# Patient Record
Sex: MALE | Race: WHITE | NOT HISPANIC OR LATINO | Employment: FULL TIME | URBAN - METROPOLITAN AREA
[De-identification: names, ages, dates, MRNs, and addresses within clinical notes are randomized per-mention and may not be internally consistent; named-entity substitution may affect disease eponyms.]

---

## 2023-07-28 ENCOUNTER — TRANSFERRED RECORDS (OUTPATIENT)
Dept: HEALTH INFORMATION MANAGEMENT | Facility: CLINIC | Age: 38
End: 2023-07-28
Payer: COMMERCIAL

## 2023-08-02 ENCOUNTER — TELEPHONE (OUTPATIENT)
Dept: ORTHOPEDICS | Facility: CLINIC | Age: 38
End: 2023-08-02
Payer: COMMERCIAL

## 2023-08-02 NOTE — TELEPHONE ENCOUNTER
I spoke with the patient and I let him know that we would not be able to do surgery the following day. I did let him know that we could switch his consult visit to a virtual visit so then they would only need to travel here for surgery. He agreed to this and we changed his upcoming visit to a video visit. He asked if he should get a pre-op visit done now I recommended he wait until after Monday but he could schedule it sometime next week. He understood this and had no further questions at this time.     STANISLAV Rae

## 2023-08-02 NOTE — TELEPHONE ENCOUNTER
[facility] Call Center    Phone Message    May a detailed message be left on voicemail: yes     Reason for Call: Symptoms or Concerns     If patient has red-flag symptoms, warm transfer to triage line    Current symptom or concern: Wife is calling as pt has a complete ACL rupture. He was seen at [facility] by Patrice Mckeon, MARICHUY, IKMMIE, JOE-JEWELL. MRI was done Tues July 25 th. Wife states that Geoff was referred to Dr. Joiner. I do not see a referral or records but I asked her to have them faxed to [phone] ASAP.  I have pt scheduled for Monday Aug 7th as this was the soonest appt and okayed by Kathleen. Wife states that they live 4 hours away and wonders if surgery could be done the next day so to save them another trip.     Symptoms have been present for:  unsure but MRI was done July 25 th  day(s)    Has patient previously been seen for this? No    By Dr. Joiner    Date: scheduled for Aug. 7th     Are there any new or worsening symptoms? No    Action Taken: message sent to team     Travel Screening: n/a

## 2023-08-03 NOTE — TELEPHONE ENCOUNTER
DIAGNOSIS:  pt. # [id number]   *Records received, images pushed to PACS  Referred to Dr. Joiner for complete ACL rupture.  MRI done July 25th  Asked pt to have records faxed    APPOINTMENT DATE: 08/07/23   NOTES STATUS DETAILS   OFFICE NOTE from referring provider Care Everywhere 07/11/23- Arlen Veliz PA-C   OFFICE NOTE from other specialist Care Everywhere 07/20/23- [facility]  - Kaylene Mcclellan PA-C    07/25/23- [facility]  - Katherine Oneill, CNP    07/28/23- [place]   MEDICATION LIST Internal    MRI PACS MR L Knee - 07/25/23 - José Antoniok   XRAYS (IMAGES & REPORTS) PACS XR L Knee - 07/28/23 - Yvonne

## 2023-08-07 ENCOUNTER — VIRTUAL VISIT (OUTPATIENT)
Dept: ORTHOPEDICS | Facility: CLINIC | Age: 38
End: 2023-08-07
Payer: OTHER MISCELLANEOUS

## 2023-08-07 ENCOUNTER — TELEPHONE (OUTPATIENT)
Dept: ORTHOPEDICS | Facility: CLINIC | Age: 38
End: 2023-08-07

## 2023-08-07 ENCOUNTER — PRE VISIT (OUTPATIENT)
Dept: ORTHOPEDICS | Facility: CLINIC | Age: 38
End: 2023-08-07

## 2023-08-07 DIAGNOSIS — S83.512A RUPTURE OF ANTERIOR CRUCIATE LIGAMENT OF LEFT KNEE, INITIAL ENCOUNTER: Primary | ICD-10-CM

## 2023-08-07 PROCEDURE — 99204 OFFICE O/P NEW MOD 45 MIN: CPT | Mod: VID | Performed by: ORTHOPAEDIC SURGERY

## 2023-08-07 NOTE — PROGRESS NOTES
Geoff is a 37 year old who is being evaluated via a billable video visit.      How would you like to obtain your AVS? Mail a copy  If the video visit is dropped, the invitation should be resent by: Text to cell phone: [phone]  Will anyone else be joining your video visit? No      CHIEF CONCERN: injured left knee 7/10    HISTORY:   Work related injury while unloading off a truck a large steel culvert plate. Works as an . Immediately felt pain, now pain is 3-4 /10. Using crutches. Wearing brace on Left knee    PAST MEDICAL HISTORY: (Reviewed with the patient and in the EPIC medical record)  none    PAST SURGICAL HISTORY: (Reviewed with the patient and in the EPIC medical record)  Left elbow biceps repair, right ankle  No knee surgery    MEDICATIONS: (Reviewed with the patient and in the EPIC medical record)    Notable medications include: none    ALLERGIES: (Reviewed with the patient and in the EPIC medical record)  none      SOCIAL HISTORY: (Reviewed with the patient and in the medical record)  --Tobacco: none  --Occupation:   --Avocation/Sport: works on car, outdoor activity    FAMILY HISTORY: (Reviewed with the patient and in the medical record)  -- No family history of bleeding, clotting, or difficulty with anesthesia    REVIEW OF SYSTEMS: (Reviewed with the patient and on the health intake form)  -- A comprehensive 10 point review of systems was conducted and is negative except as noted in the HPI    EXAM:     General: Awake, Alert and Oriented, No acute Distress. Articulate and Interactive    There is no height or weight on file to calculate BMI.    left Lower extremity :  Skin is Warm and Well perfused, no suggestion of infection  ROM 10-90 degrees by report and approximated by video   No exam as this was a video visit    IMAGING:    Radiographs of the left knee from 7/28/23 were independently reviewed by me and findings were discussed with the patient today. The  imaging demonstrates small fx of posterior tibia.    MRI of the left knee from 7/25/23 were independently reviewed by me and findings were discussed with the patient today. The imaging demonstrates high-grade injury to the anterior cruciate ligament which appears to be a complete tear.  There is some edema within the patellar tendon as well though I do not think it represents a full tear.  No definite meniscus pathology.  Impaction fracture of the posterior medial posterior lateral tibial plateau were noted.    ASSESSMENT:  Grade 3 rupture of the anterior cruciate ligament  Impaction fracture of the posterior medial posterior lateral tibial plateau though they are minimally displaced and do not require surgical intervention  Tendinosis of the patellar tendon    PLAN:  Long discussion with the patient.  Reviewed the diagnosis potential treatment options.  I offered him quadriceps tendon autograft ACL reconstruction.  I discussed with him the pros cons risk and benefits of surgery.  I have leaned away for a BTB graft in his case because of the patellar tendinosis that I see on the MRI  We discussed the pros cons risk and benefits.  We discussed the expected course of recovery alternative treatment options.  Will look for time to schedule this can be completed.  Preoperative teaching will be completed today    Video-Visit Details    Type of service:  Video Visit     Originating Location (pt. Location): Home    Distant Location (provider location):  On-site  Platform used for Video Visit: SIVI

## 2023-08-07 NOTE — TELEPHONE ENCOUNTER
Patient is scheduled for surgery with Dr. Joiner    Spoke with: Patient    Date of Surgery: 8/25/23    Location: [facility]    Post op: would like 1 week locally, 6 week in person    Pre op with Provider: Complete    H&P: Scheduled with PCP 8/8/23, Patrice Cardoso    Additional imaging/appointments: N/A    Surgery packet: Mailed to patient today     Additional comments: N/A        Leighann Smith MA on 8/7/2023 at 11:22 AM

## 2023-08-07 NOTE — LETTER
8/7/2023         RE: Geoff Chase  [address] [place] [address]      Dear Colleague,    Thank you for referring your patient, Geoff Chase, to the [facility] [place]. Please see a copy of my visit note below.      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 Works as an . Immediately felt pain, now pain is 3-4 /10. Using crutches. Wearing brace on Left knee    PAST MEDICAL HISTORY: (Reviewed with the patient and in the [facility] medical record)  none    PAST SURGICAL HISTORY: (Reviewed with the patient and in the [facility] medical record)  Left elbow biceps repair, right ankle  No knee surgery    MEDICATIONS: (Reviewed with the patient and in the [facility] medical record)    Notable medications include: none    ALLERGIES: (Reviewed with the patient and in the [facility] medical record)  none      SOCIAL HISTORY: (Reviewed with the patient and in the medical record)  --Tobacco: none  --Occupation:   --Avocation/Sport: works on car, outdoor activity    FAMILY HISTORY: (Reviewed with the patient and in the medical record)  -- No family history of bleeding, clotting, or difficulty with anesthesia    REVIEW OF SYSTEMS: (Reviewed with the patient and on the health intake form)  -- A comprehensive 10 point review of systems was conducted and is negative except as noted in the HPI    EXAM:     General: Awake, Alert and Oriented, No acute Distress. Articulate and Interactive    There is no height or weight on file to calculate BMI.    left Lower extremity :  Skin is Warm and Well perfused, no suggestion of infection  ROM 10-90 degrees by report and approximated by video   No exam as this was a video visit    IMAGING:    Radiographs of the left knee from 7/28/23 were independently reviewed by me and findings were discussed with the patient today. The imaging  demonstrates small fx of posterior tibia.    MRI of the left knee from 7/25/23 were independently reviewed by me and findings were discussed with the patient today. The imaging demonstrates high-grade injury to the anterior cruciate ligament which appears to be a complete tear.  There is some edema within the patellar tendon as well though I do not think it represents a full tear.  No definite meniscus pathology.  Impaction fracture of the posterior medial posterior lateral tibial plateau were noted.    ASSESSMENT:  Grade 3 rupture of the anterior cruciate ligament  Impaction fracture of the posterior medial posterior lateral tibial plateau though they are minimally displaced and do not require surgical intervention  Tendinosis of the patellar tendon    PLAN:  Long discussion with the patient.  Reviewed the diagnosis potential treatment options.  I offered him quadriceps tendon autograft ACL reconstruction.  I discussed with him the pros cons risk and benefits of surgery.  I have leaned away for a BTB graft in his case because of the patellar tendinosis that I see on the MRI  We discussed the pros cons risk and benefits.  We discussed the expected course of recovery alternative treatment options.  Will look for time to schedule this can be completed.  Preoperative teaching will be completed today    Again, thank you for allowing me to participate in the care of your patient.    Sincerely,      Davey Joiner MD

## 2023-08-11 NOTE — TELEPHONE ENCOUNTER
Surgery packet received and he has reviewed.  Pre-Operative Teaching Flowsheet     Person(s) involved in teaching: Patient     Motivation Level:  Receptive (willing/able to accept information) and asks appropriate questions where applicable: Yes  Any cultural factors/Jew beliefs that may influence understanding or compliance? No     Patient demonstrates understanding of the following:  Pre-operative planning, including the necessary appointments and preparation needed prior to surgery: Yes  Which situations necessitate calling provider and whom to contact: Yes  Pain management techniques pre and post op: Yes  How, and when, to access community resources: Yes    Who will drive and stay/ with patient after surgery: Nubia Luna  Pre-op exam - Completed on 8/8/23  PT to be completed at [facility]  1 week post op locally with Patrice Mckeon.  Insurance via work comp.          Additional Teaching Concerns Addressed:   Post-operative living arrangements and necessary adaptations to living environment.     Instructional Materials Used/Given: Yes, pre-op packet given including forms for Your surgery day, preparing for surgery, showering before surgery, Stop light tool introduced, Opioid pain medication guideline, pre-op physical form, and map  Patient expressed understanding of all forms given, questions were answered and will review in more detail at home.     Time spent with patient: 20 minutes.      Work comp [phone]

## 2023-08-23 ENCOUNTER — TELEPHONE (OUTPATIENT)
Dept: ORTHOPEDICS | Facility: CLINIC | Age: 38
End: 2023-08-23

## 2023-08-23 NOTE — TELEPHONE ENCOUNTER
Phoned patient to reschedule his surgery with Dr Joiner. Patient rescheduled from 8/25/23 to 8/31/23. Patient confirmed he will hold taking his weight loss medication. H&P completed in August, will not need to repeat. Patient will wait for call from surgery center next week to confirm his arrival time.

## 2023-08-30 ENCOUNTER — ANESTHESIA EVENT (OUTPATIENT)
Dept: SURGERY | Facility: AMBULATORY SURGERY CENTER | Age: 38
End: 2023-08-30

## 2023-08-30 NOTE — ANESTHESIA PREPROCEDURE EVALUATION
Anesthesia Pre-Procedure Evaluation    Patient: Geoff Chase   MRN: [id number] : 1985        Procedure : Procedure(s):  left knee examination under anesthesia, knee arthroscopy, quadriceps tendon autograft Anterior Cruciate Ligament reconstruction,  meniscus surgery.          Past Medical History:   Diagnosis Date    NO ACTIVE PROBLEMS       Past Surgical History:   Procedure Laterality Date    PE TUBES        No Known Allergies   Social History     Tobacco Use    Smoking status: Never    Smokeless tobacco: Not on file    Tobacco comments:     Nonsmoking household   Substance Use Topics    Alcohol use: Yes      Wt Readings from Last 1 Encounters:   11 83.5 kg (184 lb)        Anesthesia Evaluation            ROS/MED HX  ENT/Pulmonary:  - neg pulmonary ROS     Neurologic:  - neg neurologic ROS     Cardiovascular:  - neg cardiovascular ROS     METS/Exercise Tolerance:     Hematologic:  - neg hematologic  ROS     Musculoskeletal: Comment: L knee weakness      GI/Hepatic:  - neg GI/hepatic ROS     Renal/Genitourinary:  - neg Renal ROS     Endo:  - neg endo ROS     Psychiatric/Substance Use:  - neg psychiatric ROS     Infectious Disease:  - neg infectious disease ROS     Malignancy:  - neg malignancy ROS     Other:  - neg other ROS          Physical Exam    Airway  airway exam normal           Respiratory Devices and Support         Dental       (+) Minor Abnormalities - some fillings, tiny chips      Cardiovascular   cardiovascular exam normal          Pulmonary   pulmonary exam normal                OUTSIDE LABS:  CBC: No results found for: WBC, HGB, HCT, PLT  BMP:   Lab Results   Component Value Date     (H) 2010     COAGS: No results found for: PTT, INR, FIBR  POC: No results found for: BGM, HCG, HCGS  HEPATIC: No results found for: ALBUMIN, PROTTOTAL, ALT, AST, GGT, ALKPHOS, BILITOTAL, BILIDIRECT, AMELIA  OTHER: No results found for: PH, LACT, A1C, SARIKA, PHOS, MAG, LIPASE, AMYLASE,  TSH, T4, T3, CRP, SED    Anesthesia Plan    ASA Status:  2    NPO Status:  NPO Appropriate    Anesthesia Type: General.     - Airway: LMA   Induction: Intravenous, Propofol.   Maintenance: Balanced.        Consents    Anesthesia Plan(s) and associated risks, benefits, and realistic alternatives discussed. Questions answered and patient/representative(s) expressed understanding.     - Discussed:     - Discussed with:  Patient      - Extended Intubation/Ventilatory Support Discussed: No.      - Patient is DNR/DNI Status: No     Use of blood products discussed: No .     Postoperative Care    Pain management: IV analgesics, Oral pain medications, Multi-modal analgesia, Peripheral nerve block (Single Shot).   PONV prophylaxis: Dexamethasone or Solumedrol, Ondansetron (or other 5HT-3), Background Propofol Infusion     Comments:                Garth Yadav MD

## 2023-08-31 ENCOUNTER — ANESTHESIA (OUTPATIENT)
Dept: SURGERY | Facility: AMBULATORY SURGERY CENTER | Age: 38
End: 2023-08-31

## 2023-08-31 ENCOUNTER — HOSPITAL ENCOUNTER (OUTPATIENT)
Facility: AMBULATORY SURGERY CENTER | Age: 38
Discharge: HOME | End: 2023-08-31
Attending: ORTHOPAEDIC SURGERY | Admitting: ORTHOPAEDIC SURGERY
Payer: OTHER MISCELLANEOUS

## 2023-08-31 VITALS
RESPIRATION RATE: 12 BRPM | HEIGHT: 69 IN | WEIGHT: 235 LBS | SYSTOLIC BLOOD PRESSURE: 121 MMHG | HEART RATE: 81 BPM | BODY MASS INDEX: 34.8 KG/M2 | TEMPERATURE: 97.1 F | DIASTOLIC BLOOD PRESSURE: 79 MMHG | OXYGEN SATURATION: 97 %

## 2023-08-31 DIAGNOSIS — S83.512A RUPTURE OF ANTERIOR CRUCIATE LIGAMENT OF LEFT KNEE, INITIAL ENCOUNTER: Primary | ICD-10-CM

## 2023-08-31 PROCEDURE — 29888 ARTHRS AID ACL RPR/AGMNTJ: CPT | Mod: LT

## 2023-08-31 PROCEDURE — C9290 INJ, BUPIVACAINE LIPOSOME: HCPCS

## 2023-08-31 PROCEDURE — C1713 ANCHOR/SCREW BN/BN,TIS/BN: HCPCS

## 2023-08-31 PROCEDURE — 29882 ARTHRS KNE SRG MNISC RPR M/L: CPT | Mod: LT

## 2023-08-31 PROCEDURE — C1762 CONN TISS, HUMAN(INC FASCIA): HCPCS

## 2023-08-31 DEVICE — IMP FIBERSTITCH CVD W//TWO POLY & 2-0 FIBERWIRE AR-4570: Type: IMPLANTABLE DEVICE | Site: KNEE | Status: FUNCTIONAL

## 2023-08-31 DEVICE — IMPLANTABLE DEVICE: Type: IMPLANTABLE DEVICE | Site: KNEE | Status: FUNCTIONAL

## 2023-08-31 RX ORDER — OXYCODONE HYDROCHLORIDE 5 MG/1
10 TABLET ORAL
Status: DISCONTINUED | OUTPATIENT
Start: 2023-08-31 | End: 2023-09-01 | Stop reason: HOSPADM

## 2023-08-31 RX ORDER — ONDANSETRON 4 MG/1
4 TABLET, ORALLY DISINTEGRATING ORAL EVERY 30 MIN PRN
Status: DISCONTINUED | OUTPATIENT
Start: 2023-08-31 | End: 2023-09-01 | Stop reason: HOSPADM

## 2023-08-31 RX ORDER — NALOXONE HYDROCHLORIDE 0.4 MG/ML
0.2 INJECTION, SOLUTION INTRAMUSCULAR; INTRAVENOUS; SUBCUTANEOUS
Status: DISCONTINUED | OUTPATIENT
Start: 2023-08-31 | End: 2023-09-01 | Stop reason: HOSPADM

## 2023-08-31 RX ORDER — ONDANSETRON 4 MG/1
4 TABLET, ORALLY DISINTEGRATING ORAL
Status: CANCELLED | OUTPATIENT
Start: 2023-08-31

## 2023-08-31 RX ORDER — HYDROMORPHONE HYDROCHLORIDE 1 MG/ML
0.4 INJECTION, SOLUTION INTRAMUSCULAR; INTRAVENOUS; SUBCUTANEOUS EVERY 5 MIN PRN
Status: DISCONTINUED | OUTPATIENT
Start: 2023-08-31 | End: 2023-09-01 | Stop reason: HOSPADM

## 2023-08-31 RX ORDER — ACETAMINOPHEN 325 MG/1
650 TABLET ORAL EVERY 4 HOURS PRN
Qty: 50 TABLET | Refills: 0 | Status: SHIPPED | OUTPATIENT
Start: 2023-08-31

## 2023-08-31 RX ORDER — AMOXICILLIN 250 MG
1-2 CAPSULE ORAL 2 TIMES DAILY
Qty: 30 TABLET | Refills: 0 | Status: SHIPPED | OUTPATIENT
Start: 2023-08-31 | End: 2023-10-16

## 2023-08-31 RX ORDER — ACETAMINOPHEN 325 MG/1
975 TABLET ORAL ONCE
Status: DISCONTINUED | OUTPATIENT
Start: 2023-08-31 | End: 2023-09-01 | Stop reason: HOSPADM

## 2023-08-31 RX ORDER — DIPHENHYDRAMINE HYDROCHLORIDE 50 MG/ML
12.5 INJECTION INTRAMUSCULAR; INTRAVENOUS ONCE
Status: COMPLETED | OUTPATIENT
Start: 2023-08-31 | End: 2023-08-31

## 2023-08-31 RX ORDER — NALOXONE HYDROCHLORIDE 0.4 MG/ML
0.4 INJECTION, SOLUTION INTRAMUSCULAR; INTRAVENOUS; SUBCUTANEOUS
Status: DISCONTINUED | OUTPATIENT
Start: 2023-08-31 | End: 2023-09-01 | Stop reason: HOSPADM

## 2023-08-31 RX ORDER — SODIUM CHLORIDE, SODIUM LACTATE, POTASSIUM CHLORIDE, CALCIUM CHLORIDE 600; 310; 30; 20 MG/100ML; MG/100ML; MG/100ML; MG/100ML
INJECTION, SOLUTION INTRAVENOUS CONTINUOUS
Status: DISCONTINUED | OUTPATIENT
Start: 2023-08-31 | End: 2023-09-01 | Stop reason: HOSPADM

## 2023-08-31 RX ORDER — OXYCODONE HYDROCHLORIDE 5 MG/1
5-10 TABLET ORAL EVERY 4 HOURS PRN
Qty: 20 TABLET | Refills: 0 | Status: SHIPPED | OUTPATIENT
Start: 2023-08-31 | End: 2023-10-16

## 2023-08-31 RX ORDER — ONDANSETRON 2 MG/ML
INJECTION INTRAMUSCULAR; INTRAVENOUS PRN
Status: DISCONTINUED | OUTPATIENT
Start: 2023-08-31 | End: 2023-08-31

## 2023-08-31 RX ORDER — OXYCODONE HYDROCHLORIDE 5 MG/1
5 TABLET ORAL
Status: CANCELLED | OUTPATIENT
Start: 2023-08-31

## 2023-08-31 RX ORDER — HYDROXYZINE HYDROCHLORIDE 25 MG/1
25 TABLET, FILM COATED ORAL
Status: CANCELLED | OUTPATIENT
Start: 2023-08-31

## 2023-08-31 RX ORDER — PROPOFOL 10 MG/ML
INJECTION, EMULSION INTRAVENOUS PRN
Status: DISCONTINUED | OUTPATIENT
Start: 2023-08-31 | End: 2023-08-31

## 2023-08-31 RX ORDER — ONDANSETRON 4 MG/1
4 TABLET, ORALLY DISINTEGRATING ORAL EVERY 8 HOURS PRN
Qty: 4 TABLET | Refills: 0 | Status: SHIPPED | OUTPATIENT
Start: 2023-08-31 | End: 2023-10-16

## 2023-08-31 RX ORDER — PROPOFOL 10 MG/ML
INJECTION, EMULSION INTRAVENOUS CONTINUOUS PRN
Status: DISCONTINUED | OUTPATIENT
Start: 2023-08-31 | End: 2023-08-31

## 2023-08-31 RX ORDER — BUPIVACAINE HYDROCHLORIDE 2.5 MG/ML
INJECTION, SOLUTION EPIDURAL; INFILTRATION; INTRACAUDAL
Status: COMPLETED | OUTPATIENT
Start: 2023-08-31 | End: 2023-08-31

## 2023-08-31 RX ORDER — FENTANYL CITRATE 50 UG/ML
25-50 INJECTION, SOLUTION INTRAMUSCULAR; INTRAVENOUS
Status: DISCONTINUED | OUTPATIENT
Start: 2023-08-31 | End: 2023-09-01 | Stop reason: HOSPADM

## 2023-08-31 RX ORDER — ASPIRIN 81 MG/1
162 TABLET ORAL DAILY
Qty: 60 TABLET | Refills: 0 | Status: SHIPPED | OUTPATIENT
Start: 2023-08-31 | End: 2024-03-04

## 2023-08-31 RX ORDER — KETOROLAC TROMETHAMINE 30 MG/ML
INJECTION, SOLUTION INTRAMUSCULAR; INTRAVENOUS PRN
Status: DISCONTINUED | OUTPATIENT
Start: 2023-08-31 | End: 2023-08-31

## 2023-08-31 RX ORDER — KETAMINE HYDROCHLORIDE 10 MG/ML
INJECTION INTRAMUSCULAR; INTRAVENOUS PRN
Status: DISCONTINUED | OUTPATIENT
Start: 2023-08-31 | End: 2023-08-31

## 2023-08-31 RX ORDER — FENTANYL CITRATE 50 UG/ML
50 INJECTION, SOLUTION INTRAMUSCULAR; INTRAVENOUS EVERY 5 MIN PRN
Status: DISCONTINUED | OUTPATIENT
Start: 2023-08-31 | End: 2023-09-01 | Stop reason: HOSPADM

## 2023-08-31 RX ORDER — HYDROXYZINE PAMOATE 25 MG/1
25-50 CAPSULE ORAL 3 TIMES DAILY PRN
Qty: 30 CAPSULE | Refills: 0 | Status: SHIPPED | OUTPATIENT
Start: 2023-08-31 | End: 2023-10-16

## 2023-08-31 RX ORDER — ONDANSETRON 2 MG/ML
4 INJECTION INTRAMUSCULAR; INTRAVENOUS EVERY 30 MIN PRN
Status: DISCONTINUED | OUTPATIENT
Start: 2023-08-31 | End: 2023-09-01 | Stop reason: HOSPADM

## 2023-08-31 RX ORDER — FLUMAZENIL 0.1 MG/ML
0.2 INJECTION, SOLUTION INTRAVENOUS
Status: DISCONTINUED | OUTPATIENT
Start: 2023-08-31 | End: 2023-09-01 | Stop reason: HOSPADM

## 2023-08-31 RX ORDER — ACETAMINOPHEN 325 MG/1
975 TABLET ORAL ONCE
Status: COMPLETED | OUTPATIENT
Start: 2023-08-31 | End: 2023-08-31

## 2023-08-31 RX ORDER — CEFAZOLIN SODIUM 2 G/50ML
2 SOLUTION INTRAVENOUS SEE ADMIN INSTRUCTIONS
Status: DISCONTINUED | OUTPATIENT
Start: 2023-08-31 | End: 2023-09-01 | Stop reason: HOSPADM

## 2023-08-31 RX ORDER — LIDOCAINE 40 MG/G
CREAM TOPICAL
Status: DISCONTINUED | OUTPATIENT
Start: 2023-08-31 | End: 2023-09-01 | Stop reason: HOSPADM

## 2023-08-31 RX ORDER — BUPIVACAINE HYDROCHLORIDE AND EPINEPHRINE 2.5; 5 MG/ML; UG/ML
INJECTION, SOLUTION INFILTRATION; PERINEURAL PRN
Status: DISCONTINUED | OUTPATIENT
Start: 2023-08-31 | End: 2023-08-31 | Stop reason: HOSPADM

## 2023-08-31 RX ORDER — OXYCODONE HYDROCHLORIDE 5 MG/1
5 TABLET ORAL
Status: COMPLETED | OUTPATIENT
Start: 2023-08-31 | End: 2023-08-31

## 2023-08-31 RX ORDER — FENTANYL CITRATE 50 UG/ML
25 INJECTION, SOLUTION INTRAMUSCULAR; INTRAVENOUS EVERY 5 MIN PRN
Status: DISCONTINUED | OUTPATIENT
Start: 2023-08-31 | End: 2023-09-01 | Stop reason: HOSPADM

## 2023-08-31 RX ORDER — ACETAMINOPHEN 325 MG/1
650 TABLET ORAL
Status: CANCELLED | OUTPATIENT
Start: 2023-08-31

## 2023-08-31 RX ORDER — DEXAMETHASONE SODIUM PHOSPHATE 4 MG/ML
INJECTION, SOLUTION INTRA-ARTICULAR; INTRALESIONAL; INTRAMUSCULAR; INTRAVENOUS; SOFT TISSUE PRN
Status: DISCONTINUED | OUTPATIENT
Start: 2023-08-31 | End: 2023-08-31

## 2023-08-31 RX ORDER — CEFAZOLIN SODIUM 2 G/50ML
2 SOLUTION INTRAVENOUS
Status: COMPLETED | OUTPATIENT
Start: 2023-08-31 | End: 2023-08-31

## 2023-08-31 RX ORDER — HYDROMORPHONE HYDROCHLORIDE 1 MG/ML
0.2 INJECTION, SOLUTION INTRAMUSCULAR; INTRAVENOUS; SUBCUTANEOUS EVERY 5 MIN PRN
Status: DISCONTINUED | OUTPATIENT
Start: 2023-08-31 | End: 2023-09-01 | Stop reason: HOSPADM

## 2023-08-31 RX ORDER — LIDOCAINE HYDROCHLORIDE 20 MG/ML
INJECTION, SOLUTION INFILTRATION; PERINEURAL PRN
Status: DISCONTINUED | OUTPATIENT
Start: 2023-08-31 | End: 2023-08-31

## 2023-08-31 RX ADMIN — LIDOCAINE HYDROCHLORIDE 100 MG: 20 INJECTION, SOLUTION INFILTRATION; PERINEURAL at 08:48

## 2023-08-31 RX ADMIN — FENTANYL CITRATE 25 MCG: 50 INJECTION, SOLUTION INTRAMUSCULAR; INTRAVENOUS at 11:08

## 2023-08-31 RX ADMIN — DEXAMETHASONE SODIUM PHOSPHATE 4 MG: 4 INJECTION, SOLUTION INTRA-ARTICULAR; INTRALESIONAL; INTRAMUSCULAR; INTRAVENOUS; SOFT TISSUE at 08:48

## 2023-08-31 RX ADMIN — FENTANYL CITRATE 25 MCG: 50 INJECTION, SOLUTION INTRAMUSCULAR; INTRAVENOUS at 11:03

## 2023-08-31 RX ADMIN — HYDROMORPHONE HYDROCHLORIDE 0.2 MG: 1 INJECTION, SOLUTION INTRAMUSCULAR; INTRAVENOUS; SUBCUTANEOUS at 11:26

## 2023-08-31 RX ADMIN — OXYCODONE HYDROCHLORIDE 5 MG: 5 TABLET ORAL at 11:01

## 2023-08-31 RX ADMIN — Medication 0.5 MG: at 09:44

## 2023-08-31 RX ADMIN — PROPOFOL 350 MG: 10 INJECTION, EMULSION INTRAVENOUS at 08:48

## 2023-08-31 RX ADMIN — KETOROLAC TROMETHAMINE 30 MG: 30 INJECTION, SOLUTION INTRAMUSCULAR; INTRAVENOUS at 10:34

## 2023-08-31 RX ADMIN — PROPOFOL 150 MCG/KG/MIN: 10 INJECTION, EMULSION INTRAVENOUS at 08:48

## 2023-08-31 RX ADMIN — HYDROMORPHONE HYDROCHLORIDE 0.3 MG: 1 INJECTION, SOLUTION INTRAMUSCULAR; INTRAVENOUS; SUBCUTANEOUS at 11:34

## 2023-08-31 RX ADMIN — SODIUM CHLORIDE, SODIUM LACTATE, POTASSIUM CHLORIDE, CALCIUM CHLORIDE: 600; 310; 30; 20 INJECTION, SOLUTION INTRAVENOUS at 07:57

## 2023-08-31 RX ADMIN — Medication 0.5 MG: at 08:53

## 2023-08-31 RX ADMIN — DIPHENHYDRAMINE HYDROCHLORIDE 12.5 MG: 50 INJECTION INTRAMUSCULAR; INTRAVENOUS at 11:30

## 2023-08-31 RX ADMIN — CEFAZOLIN SODIUM 2 G: 2 SOLUTION INTRAVENOUS at 08:40

## 2023-08-31 RX ADMIN — Medication 0.5 MG: at 10:45

## 2023-08-31 RX ADMIN — FENTANYL CITRATE 50 MCG: 50 INJECTION, SOLUTION INTRAMUSCULAR; INTRAVENOUS at 08:17

## 2023-08-31 RX ADMIN — BUPIVACAINE HYDROCHLORIDE 10 ML: 2.5 INJECTION, SOLUTION EPIDURAL; INFILTRATION; INTRACAUDAL at 08:30

## 2023-08-31 RX ADMIN — ONDANSETRON 4 MG: 2 INJECTION INTRAMUSCULAR; INTRAVENOUS at 08:52

## 2023-08-31 RX ADMIN — KETAMINE HYDROCHLORIDE 50 MG: 10 INJECTION INTRAMUSCULAR; INTRAVENOUS at 08:53

## 2023-08-31 RX ADMIN — FENTANYL CITRATE 50 MCG: 50 INJECTION, SOLUTION INTRAMUSCULAR; INTRAVENOUS at 11:12

## 2023-08-31 RX ADMIN — ACETAMINOPHEN 975 MG: 325 TABLET ORAL at 07:50

## 2023-08-31 RX ADMIN — Medication 0.5 MG: at 09:52

## 2023-08-31 NOTE — ANESTHESIA POSTPROCEDURE EVALUATION
Patient: Geoff Chase    Procedure: Procedure(s):  left knee examination under anesthesia, knee arthroscopy, quadriceps tendon autograft Anterior Cruciate Ligament reconstruction,  meniscus repair.       Anesthesia Type:  General    Note:  Disposition: Outpatient   Postop Pain Control: Uneventful            Sign Out: Well controlled pain   PONV: No   Neuro/Psych: Uneventful            Sign Out: Acceptable/Baseline neuro status   Airway/Respiratory: Uneventful            Sign Out: Acceptable/Baseline resp. status   CV/Hemodynamics: Uneventful            Sign Out: Acceptable CV status; No obvious hypovolemia; No obvious fluid overload   Other NRE:    DID A NON-ROUTINE EVENT OCCUR?     Event details/Postop Comments:  Itchy and blurry vision slowly improving           Last vitals:  Vitals Value Taken Time   /79 08/31/23 1130   Temp 36.2  C (97.1  F) 08/31/23 1100   Pulse 75 08/31/23 1135   Resp 9 08/31/23 1135   SpO2 97 % 08/31/23 1104   Vitals shown include unvalidated device data.    Electronically Signed By: Garth Yadav MD  August 31, 2023  12:08 PM

## 2023-08-31 NOTE — OP NOTE
PREOPERATIVE DIAGNOSIS:   Grade 3 rupture of the left anterior cruciate ligament  No definite meniscus tears    POSTOPERATIVE DIAGNOSIS:  Grade 3 rupture the left anterior cruciate ligament  Vertical tear of the posterior horn of the medial meniscus  Vertical undersurface tear of the posterior horn of the lateral meniscus    PROCEDURE:  Examination under anesthesia left knee  Left knee arthroscopy  Quadricep tendon autograft ACL reconstruction  All inside medial meniscus repair  All inside lateral meniscus repair    DATE OF SURGERY: 8/31/2023    SURGEON: Davey Joiner MD    ASSISTANT: None.     RESIDENT OR FELLOW: Michael Ledesma MD    OPERATIVE INDICATIONS: Geoff Chase is a pleasant 37 year old who I saw through my orthopedic clinic with a history, physical, imaging consistent with left ACL rupture.  I reviewed with the patient the risks, benefits, complications, techniques and alternatives to surgery.  Together through combined decision making approach elected proceed with ACL reconstruction quadricep tendon autograft evaluation of his meniscus.  He desired to proceed.  Left we reviewed the expected course of recovery and the potential expected outcomes.  The patient understood both the risks and benefits and desired to proceed despite the risks.    OPERATIVE DETAILS: In the preoperative area the patient's informed consent was reviewed and they desired to proceed.  The left leg was marked and the patient was in agreement.  The patient was taken to the operating room where a timeout was performed and all parties were in agreement.  Preoperative antibiotics were given within 1 hour of the time of incision.  The patient was placed in the supine position and surrendered to LMA anesthesia.  No tourniquet was applied.  Egg crate was placed beneath the well leg and a side post was utilized.  The operative leg was prepped and draped in the usual sterile fashion.     Examination Under Anesthesia:    Range of  motion was 3 degrees of hyperextension to 135 degrees of flexion.  Stable to varus valgus stress testing.  Stable posterior drawer testing.  1 quadrant lateral traction patella, 1 quadrant medial.  Tilt to neutral.  2B Lachman, 1+ pivot shift.    At this time a 3 cm incision was made directly over the quadricep tendon was carried down through the skin and subcutaneous tissues and meticulous hemostasis was insured.  The quadricep tendon was identified.  And subcutaneous flaps were elevated.  We selected a section of 10 and 10 mm in width.  It began to peel it off the superior pole the patella.  We then proceeded proximally for at least 7 cm.  Care was taken to maintain a parallel cut.  After we obtain at 7 cm proximal.  It was harvested free.  It was taken to the back table where our fiber tag was attached to both ends 1 with a cortical button 1 without for later button allocation.  The final graft dimensions measured 72 mm x 10.5 on the femoral side by 10 on the tibial side.  It was tensioned at 20 pounds for 20 minutes under antibiotic saline to remove any creep.  At this time a layered closure was initiated over harvest site with 1 Vicryl suture in a running fashion and then the subcutaneous tissue was closed with 2-0 Vicryl suture and the skin was closed with Monocryl.    Anterior medial anterolateral arthroscopic portals were created and a diagnostic arthroscopy was performed with the following findings: Medial femoral condyle medial tibial plateau normal.  No femoral condyle, lateral tibial plateau normal.  Medial patella facet central ridge of the patella facet central trochlea normal.  Vertical tear of the posterior horn mid body of meniscus.  No tears when viewed through the Gillquist portal.  It was only visible on the inferior leaflet some instability to probing.  Grade 3 rupture the anterior cruciate ligament.  Vertical tear of the posterior horn of the lateral meniscus present in the undersurface only  and I did not extend to the superior surface.    At this time a shaver and a biter were introduced and a debridement of the intercondylar notch was performed we could visualize the anatomic insertion of the femoral origin we then created a nest on the tibial side with a thermal device.  With the arthroscope in the medial portal the 6-9 guide was introduced.  Osseous length measured 38 mm.  We reamed a 10.5 x 22 mm socket.  Passing suture was placed to his right medial portal.  The arthroscope was returned to the lateral portal where tip tip guide was introduced.  Osseous length measured 40 mm.  We reamed a 30 mm socket by 10 mm in diameter.  Passing suture was placed we can under no soft tissue bridges but at this time rasping was performed along the meniscosynovial junction on the medial and the lateral side.  Our skin was introduced and 1 all inside device were placed in the medial side then 1 all inside device was placed in the lateral side.  There was no further instability to probing and excellent compression was noted across the tear sites.  At this time the graft was brought up and the cortical button was deployed over the lateral cortex.  The graft was then reduced into the femoral tunnel.  The graft was well balanced within the knee.  There was at least 20 mm of graft both proximally in the femoral tunnel and distally in the tibial tunnel.  The knee was brought to full extension and the ABS button was assembled and tensioning and retention was then performed.  Then with the knee still in full extension we retention the femoral side.    Copious irrigation was performed an a layered closure was initiated, sterile dressings were applied and the patient was transferred to the recovery room in stable condition with stable vital signs.    ESTIMATED BLOOD LOSS: 25 mL.    TOURNIQUET TIME: No tourniquet was placed.    COMPLICATIONS: None apparent.    DRAINS: None.    SPECIMENS: None.     POSTOPERATIVE  PLAN:  Weightbearing as tolerated, wean from crutches when able  Knee immobilizer times 1 week then wean when able  Average time to wean from crutches and brace is 2-4 weeks  Given that overall his meniscus tears are small and really quite stable I think we can treat this as a standard ACL reconstruction without specific deference to meniscus repair surgery  The goal is to walk into my clinic at 6 weeks with no brace and no crutches  No running until 3 months  No sports until 6 months, return to game competition at 7-10 months  Shower on day 3  Start physical therapy day 3-5   x 4 weeks

## 2023-08-31 NOTE — ANESTHESIA CARE TRANSFER NOTE
"  Patient: Geoff Chase    Procedure: Procedure(s):  left knee examination under anesthesia, knee arthroscopy, quadriceps tendon autograft Anterior Cruciate Ligament reconstruction,  meniscus repair.       Diagnosis: Rupture of anterior cruciate ligament of left knee, initial encounter [S83.512A]  Diagnosis Additional Information: No value filed.    Anesthesia Type:   General     Note:    Oropharynx: oropharynx clear of all foreign objects and spontaneously breathing  Level of Consciousness: awake  Oxygen Supplementation: face mask  Level of Supplemental Oxygen (L/min / FiO2): 4  Independent Airway: airway patency satisfactory and stable  Dentition: dentition unchanged  Vital Signs Stable: post-procedure vital signs reviewed and stable  Report to RN Given: handoff report given  Patient transferred to: PACU  Comments: Uneventful transport - O2 mask off after baseline VS obtained in Phase 1  Report to RN - Marina  Exchanging well; color natl  Pt states he has an \"itchy face\"; no visible rash, blotches, or hives  Pt responds appropriately to command  IV patent  Lips/teeth/dentition as preop status  Questions answered      Handoff Report: Identifed the Patient, Identified the Reponsible Provider, Reviewed the pertinent medical history, Discussed the surgical course, Reviewed Intra-OP anesthesia mangement and issues during anesthesia, Set expectations for post-procedure period and Allowed opportunity for questions and acknowledgement of understanding      Vitals:  Vitals Value Taken Time   /67 08/31/23 1052   Temp 97.1    Pulse 84 08/31/23 1055   Resp 8 08/31/23 1055   SpO2 95 % 08/31/23 1055   Vitals shown include unvalidated device data.    Electronically Signed By: MARICHUY BARRON CRNA  August 31, 2023  10:57 AM  "

## 2023-08-31 NOTE — INTERVAL H&P NOTE
"I have reviewed the surgical (or preoperative) H&P that is linked to this encounter, and examined the patient. There are no significant changes    Clinical Conditions Present on Arrival:  Clinically Significant Risk Factors Present on Admission                  # Obesity: Estimated body mass index is 34.7 kg/m  as calculated from the following:    Height as of this encounter: 1.753 m (5' 9\").    Weight as of this encounter: 106.6 kg (235 lb).       "

## 2023-08-31 NOTE — OR NURSING
Patient received left side Adductor nerve block  with Exparel.  Fentanyl 50mcg and Versed 1mg given. Tolerated procedure well.     Shyla Link RN

## 2023-08-31 NOTE — ANESTHESIA PROCEDURE NOTES
Adductor canal Procedure Note    Pre-Procedure   Staff -        Anesthesiologist:  Garth Yadav MD       Resident/Fellow: Sai Phillips MD       Performed By: resident and with residents       Procedure performed by resident/fellow/CRNA in presence of a teaching physician.         Location: pre-op       Pre-Anesthestic Checklist: patient identified, IV checked, site marked, risks and benefits discussed, informed consent, monitors and equipment checked, pre-op evaluation, at physician/surgeon's request and post-op pain management  Timeout:       Correct Patient: Yes        Correct Procedure: Yes        Correct Site: Yes        Correct Position: Yes        Correct Laterality: Yes        Site Marked: Yes  Procedure Documentation  Procedure: Adductor canal       Diagnosis: POSTOP PAIN       Laterality: left       Patient Position: supine       Patient Prep/Sterile Barriers: sterile gloves, mask       Skin prep: Chloraprep       Needle Gauge: 21.        Needle Length (millimeters): 110        Ultrasound guided       1. Ultrasound was used to identify targeted nerve, plexus, vascular marker, or fascial plane and place a needle adjacent to it in real-time.       2. Ultrasound was used to visualize the spread of anesthetic in close proximity to the above referenced structure.       3. A permanent image is entered into the patient's record.    Assessment/Narrative         The placement was negative for: blood aspirated, painful injection and site bleeding       Paresthesias: No.       Bolus given via needle. no blood aspirated via catheter.        Secured via.        Insertion/Infusion Method: Single Shot       Complications: none    Medication(s) Administered   Bupivacaine 0.25% PF (Infiltration) - Infiltration   10 mL - 8/31/2023 8:30:00 AM  Bupivacaine liposome (Exparel) 1.3% LA inj susp (Infiltration) - Infiltration   10 mL - 8/31/2023 8:30:00 AM   Comments:  Routine left adductor canal nerve block without  "complications. Patient tolerated well.    133 mg Exparel used.      FOR [facility] ONLY:   Pain Team Contact information: please page the Pain Team Via [employer]. Search \"Pain\". During daytime hours, please page the attending first. At night please page the resident first.      "

## 2023-08-31 NOTE — DISCHARGE INSTRUCTIONS
"    Safety Tips for Using Crutches    Crutch Fit:  Assume good standing posture with shoulders relaxed and crutch tips 6-8 inches out from the side of the foot.  The underarm pad should fall 2-3 fingers width below the armpit.  The handgrip is positioned level with the wrist to allow 30  flexion at the elbow.    Safety Tips:  Bear weight on your hands, not on your armpits.  Do not add extra padding to the underarm pad. This will, in effect, lengthen the crutches and increase risk of nerve injury.  Wear flat, properly fitting shoes. Do not walk in stocking feet, high heels or slippers.  Household hazards:  --Throw rugs should be removed from floors.  --Stairs should be cleared of obstacles.  --Use extra caution on slippery, highly polished, littered or uneven floor surfaces.  --Check for electric cords.  Check crutch tips for excessive wear and keep wing nuts tight.  While walking, look forward with  head up  and  eyes open.  Take equal length steps.  Use BOTH crutches.    Stairs Sequence:  UP: \"Good\" leg first, followed by  bad  leg, then crutches.  DOWN: Crutches, followed by  bad  leg, \"good\" leg.     Walking with Crutches:  Move both crutches forward at the same time.  Non-Weight Bearing (NWB):  Hold the involved leg up and swing through the crutches with the involved leg. The involved leg does not touch the floor.  Toe Touch Weight Bearing (TTWB): Move the involved leg forward. Rest it lightly on the floor for balance only. Step through the crutches with the uninvolved leg.  Partial Weight Bearing (PWB): Move the involved leg forward. Step down the weight of the leg only.  Step through the crutches with the uninvolved leg.  Weight Bearing As Tolerated (WBAT): Move the involved leg forward. Put as much pressure through the involved leg as you can tolerate comfortably. Then step through the crutches with the uninvolved leg.    [facility]  Home Care Following Anesthesia  For 24 " "hours after surgery:  Get plenty of rest.  A responsible adult must stay with you for at least 24 hours after you leave the surgery center.  Do not drive or use heavy equipment.  If you have weakness or tingling, don't drive or use heavy equipment until this feeling goes away.   Do not drink alcohol.   Avoid strenuous or risky activities.  Ask for help when climbing stairs.  You may feel lightheaded.  IF so, sit for a few minutes before standing.  Have someone help you get up.   If you have nausea (feel sick to your stomach): Drink only clear liquids such as apple juice, ginger ale, broth or 7-Up.  Rest may also help.  Be sure to drink enough fluids.  Move to a regular diet as you feel able.   You may have a slight fever.  Call the doctor if your fever is over 100 F (37.7 C) (taken under the tongue) or lasts longer than 24 hours.  You may have a dry mouth, a sore throat, muscle aches or trouble sleeping. These should go away after 24 hours.  Do not make important or legal decisions.   It is recommended to avoid smoking.        Today you received an Exparel block to numb the nerves near your surgery site.  This is a block using local anesthetic or \"numbing\" medication injected around the nerves to anesthetize or \"numb\" the area supplied by those nerves.  This block is injected into the muscle layer near your surgical site.  This medication may numb the location where you had surgery up to 72 hours.  If your surgical site is an arm or leg you should be careful with your affected limb, since it is possible to injure your limb without being aware of it due to the numbing.  Until full feeling returns, you should guard against bumping or hitting your limb, and avoid extreme hot or cold temperatures on the skin.  As the block wears off, the feeling will return as a tingling or prickly sensation near your surgical site.  You will experince more discomfort from your incision as the feeling returns.  You may want to take a " pain pill (a narcotic or Tylenol if this was prescribed by your surgeon) when you start to experience mild pain before the pain beomes more severe.  If your pain medications do not control your pain, you should notify your surgeon.    Tips for taking pain medications  To get the best pain relief possible, remember these points:  Take pain medications as directed, before pain becomes severe.  Pain medication can upset your stomach: taking it with food may help.  Constipation is a common side effect of pain medication. Drink plenty of  fluids.  Eat foods high in fiber. Take a stool softener if recommended by your doctor or pharmacist.  Do not drink alcohol, drive or operate machinery while taking pain medications.  Ask about other ways to control pain, such as with heat, ice or relaxation.    Tylenol/Acetaminophen Consumption    If you feel your pain relief is insufficient, you may take Tylenol/Acetaminophen in addition to your narcotic pain medication.   Be careful not to exceed 4,000 mg of Tylenol/Acetaminophen in a 24 hour period from all sources.  If you are taking extra strength Tylenol/acetaminophen (500 mg), the maximum dose is 8 tablets in 24 hours.  If you are taking regular strength acetaminophen (325 mg), the maximum dose is 12 tablets in 24 hours.    Call a doctor for any of the following:  Signs of infection (fever, growing tenderness at the surgery site, a large amount of drainage or bleeding, severe pain, foul-smelling drainage, redness, swelling).  It has been over 8 to 10 hours since surgery and you are still not able to urinate (pass water).  Headache for over 24 hours.  Numbness, tingling or weakness the day after surgery (if you had spinal anesthesia).  Signs of Covid-19 infection (temperature over 100 degrees, shortness of breath, cough, loss of taste/smell, generalized body aches, persistent headache, chills, sore throat, nausea/vomiting/diarrhea)  Your doctor is:       Dr. Davey Joiner,  Orthopaedics: [phone]               Or dial [phone] and ask for the resident on call for:  Orthopaedics  For emergency care, call the:  [facility] Emergency Department: [phone] (TTY for hearing impaired: [phone])

## 2023-09-06 ENCOUNTER — TRANSFERRED RECORDS (OUTPATIENT)
Dept: HEALTH INFORMATION MANAGEMENT | Facility: CLINIC | Age: 38
End: 2023-09-06

## 2023-09-07 ENCOUNTER — TRANSFERRED RECORDS (OUTPATIENT)
Dept: HEALTH INFORMATION MANAGEMENT | Facility: CLINIC | Age: 38
End: 2023-09-07

## 2023-09-23 ENCOUNTER — HEALTH MAINTENANCE LETTER (OUTPATIENT)
Age: 38
End: 2023-09-23

## 2023-10-10 DIAGNOSIS — Z98.890 S/P ACL RECONSTRUCTION: Primary | ICD-10-CM

## 2023-10-16 ENCOUNTER — ANCILLARY PROCEDURE (OUTPATIENT)
Dept: GENERAL RADIOLOGY | Facility: CLINIC | Age: 38
End: 2023-10-16
Attending: ORTHOPAEDIC SURGERY
Payer: OTHER MISCELLANEOUS

## 2023-10-16 ENCOUNTER — OFFICE VISIT (OUTPATIENT)
Dept: ORTHOPEDICS | Facility: CLINIC | Age: 38
End: 2023-10-16
Payer: OTHER MISCELLANEOUS

## 2023-10-16 VITALS — BODY MASS INDEX: 34.8 KG/M2 | HEIGHT: 69 IN | WEIGHT: 235 LBS

## 2023-10-16 DIAGNOSIS — Z98.890 S/P ACL RECONSTRUCTION: ICD-10-CM

## 2023-10-16 DIAGNOSIS — Z98.890 S/P ACL RECONSTRUCTION: Primary | ICD-10-CM

## 2023-10-16 PROCEDURE — 99024 POSTOP FOLLOW-UP VISIT: CPT | Performed by: ORTHOPAEDIC SURGERY

## 2023-10-16 PROCEDURE — 73560 X-RAY EXAM OF KNEE 1 OR 2: CPT | Mod: LT | Performed by: RADIOLOGY

## 2023-10-16 NOTE — PROGRESS NOTES
DIAGNOSIS:   Left knee acl tear, medial and lateral meniscus repairs    PROCEDURES:  Acl reconstrcution QT auto, medial and lateral meniscus repairs, DOS 8/31/23    HISTORY:  Doing well, some low back and ankle pain, off opioids, doing PT    EXAM:     General: Awake, Alert, and oriented. Articulates and communicates with a normal affect     left Lower Extremity:  Incisions well healed without evidence of infection  No  post-operative effusion or ecchymosis  Range of motion 0-120  stability exam not performed  Neurovascularly intact    IMAGING:  Radiographs of the left knee from today were independently reviewed by me and findings were discussed with the patient today. The imaging demonstrates tunnels and hardware in good position.    ASSESSMENT:  Left acl QT and medial and lateral meniscus repair    PLAN:   Weightbearing: WBAT  Range of Motion: No range of motion restrictions  Pain Medications: We reviewed post-operative pain medications at today's visit. The patient has stopped all opioid pain medications and no further refills are required  Extension: We reviewed the importance of full knee extension and demonstrated the relevant exercises as appropriate  Crutches/Brace: Patient no longer requires the hinged knee brace or crutches.   Acitivity Restrictions:  Discussed that this is the dangerous time after ACL reconstruction  Reviewed activity restrictions at today's visit  Goal to progress strength and motion to allow straight line running at three months from the date of surgery    Follow up: 6 weeks with no new radiographs needed

## 2023-10-16 NOTE — NURSING NOTE
"Reason For Visit:   Chief Complaint   Patient presents with    RECHECK     DOS: 8/31/23 left knee arthroscopy, quadriceps tendon autograft ACL reconstruction meniscus repair     Date of surgery: 8/31/23  Type of surgery:   PROCEDURE:  Examination under anesthesia left knee  Left knee arthroscopy  Quadricep tendon autograft ACL reconstruction  All inside medial meniscus repair  All inside lateral meniscus repair    Overall doing well. He has difficulty with stairs and lunges. He also has noticed some decreased ROM in his ankle. He takes tylenol and ibuprofen as needed.     SANE Score  Left Knee: 70  Right Knee: 100    Pain Assessment  Patient Currently in Pain: Yes  0-10 Pain Scale: 2  Primary Pain Location: Knee    Ht 1.753 m (5' 9\")   Wt 106.6 kg (235 lb)   BMI 34.70 kg/m         No Known Allergies    Current Outpatient Medications   Medication    acetaminophen (TYLENOL) 325 MG tablet    aspirin 81 MG EC tablet    tirzepatide (MOUNJARO) 5 MG/0.5ML pen    hydrOXYzine (VISTARIL) 25 MG capsule    ondansetron (ZOFRAN ODT) 4 MG ODT tab    oxyCODONE (ROXICODONE) 5 MG tablet    senna-docusate (SENOKOT-S/PERICOLACE) 8.6-50 MG tablet     No current facility-administered medications for this visit.         Kyra De Jesus, ATC    "

## 2023-10-16 NOTE — LETTER
10/16/2023         RE: Geoff Chase  [address] [place] [address]        Dear Colleague,    Thank you for referring your patient, Geoff Chase, to the [facility] [place]. Please see a copy of my visit note below.    DIAGNOSIS:   Left knee acl tear, medial and lateral meniscus repairs    PROCEDURES:  Acl reconstrcution QT auto, medial and lateral meniscus repairs, DOS 8/31/23    HISTORY:  Doing well, some low back and ankle pain, off opioids, doing PT    EXAM:     General: Awake, Alert, and oriented. Articulates and communicates with a normal affect     left Lower Extremity:  Incisions well healed without evidence of infection  No  post-operative effusion or ecchymosis  Range of motion 0-120  stability exam not performed  Neurovascularly intact    IMAGING:  Radiographs of the left knee from today were independently reviewed by me and findings were discussed with the patient today. The imaging demonstrates tunnels and hardware in good position.    ASSESSMENT:  Left acl QT and medial and lateral meniscus repair    PLAN:   Weightbearing: WBAT  Range of Motion: No range of motion restrictions  Pain Medications: We reviewed post-operative pain medications at today's visit. The patient has stopped all opioid pain medications and no further refills are required  Extension: We reviewed the importance of full knee extension and demonstrated the relevant exercises as appropriate  Crutches/Brace: Patient no longer requires the hinged knee brace or crutches.   Acitivity Restrictions:  Discussed that this is the dangerous time after ACL reconstruction  Reviewed activity restrictions at today's visit  Goal to progress strength and motion to allow straight line running at three months from the date of surgery    Follow up: 6 weeks with no new radiographs needed        Again, thank you for allowing me to participate in the care of your patient.        Sincerely,        Davey  Evangelista Joiner MD

## 2023-10-16 NOTE — LETTER
Return to Work  2023     Seen today: Yes    Patient:  Geoff Chase  :   1985  MRN:     [id number]  Physician: PAMELA JOINER    To whom it may concern,   Geoff Chase has been under my professional care for his left knee injury and surgery. Starting today, 2023, he is unable to return to work. This will be in effect until his follow-up appointment on 2023.    If any questions, please contact my office.    Sincerely,   Electronically signed by Pamela Joiner MD

## 2023-10-23 ENCOUNTER — TRANSFERRED RECORDS (OUTPATIENT)
Dept: HEALTH INFORMATION MANAGEMENT | Facility: CLINIC | Age: 38
End: 2023-10-23

## 2023-11-27 ENCOUNTER — OFFICE VISIT (OUTPATIENT)
Dept: ORTHOPEDICS | Facility: CLINIC | Age: 38
End: 2023-11-27
Payer: OTHER MISCELLANEOUS

## 2023-11-27 VITALS — WEIGHT: 235 LBS | BODY MASS INDEX: 34.8 KG/M2 | HEIGHT: 69 IN

## 2023-11-27 DIAGNOSIS — Z98.890 S/P ACL RECONSTRUCTION: Primary | ICD-10-CM

## 2023-11-27 PROCEDURE — 99024 POSTOP FOLLOW-UP VISIT: CPT | Mod: GC | Performed by: ORTHOPAEDIC SURGERY

## 2023-11-27 NOTE — NURSING NOTE
"Reason For Visit:   Chief Complaint   Patient presents with    RECHECK     DOS: 8/31/23 left knee arthroscopy, ACL reconstruction quad tendon autograft, meniscus repair     Date of surgery: 8/31/23  Type of surgery:   PROCEDURE:  Examination under anesthesia left knee  Left knee arthroscopy  Quadricep tendon autograft ACL reconstruction  All inside medial meniscus repair  All inside lateral meniscus repair    Overall doing well. He stiffness after sitting for long periods and difficulty with stairs. He doesn't have full knee extension, he feels like he has plateau with PT.     SANE Score  Left Knee: 70  Right Knee: 100    Pain Assessment  Patient Currently in Pain: Yes  0-10 Pain Scale: 3  Primary Pain Location: Knee    Ht 1.753 m (5' 9\")   Wt 106.6 kg (235 lb)   BMI 34.70 kg/m         No Known Allergies    Current Outpatient Medications   Medication    acetaminophen (TYLENOL) 325 MG tablet    aspirin 81 MG EC tablet    tirzepatide (MOUNJARO) 5 MG/0.5ML pen     No current facility-administered medications for this visit.         Kyra De Jesus, ATC    "

## 2023-11-27 NOTE — PROGRESS NOTES
DIAGNOSIS:   Left knee acl tear, medial and lateral meniscus repairs    PROCEDURES:  Acl reconstrcution QT auto, medial and lateral meniscus repairs, DOS 8/31/23    HISTORY:  Doing well. Notices worse pain with going up and down stairs in the anterior knee around the patella tendon.     EXAM:     General: Awake, Alert, and oriented. Articulates and communicates with a normal affect     left Lower Extremity:  Incisions well healed without evidence of infection  No  post-operative effusion or ecchymosis  Range of motion 0-135  stability exam not performed  Neurovascularly intact    IMAGING:  No new images    ASSESSMENT:  Left acl QT and medial and lateral meniscus repair, 3 months post op     PLAN:   Weightbearing: WBAT  Work: Note provided for ok to return to work with opportunity to rest, sit, or stand as needed   Range of Motion: No range of motion restrictions  Crutches/Brace: Patient no longer requires the hinged knee brace or crutches.   Acitivity Restrictions:  Ok for in line running  Avoid cutting or pivoting     Follow up: 3 months with no new radiographs needed    Trinidad Rg MD   Sports Fellow

## 2023-11-27 NOTE — PROGRESS NOTES
Patient seen and examined with the fellow. I also personally reviewed the images and interpreted the imaging myself.     Assesment: 3 month following acl reconstruction QT autograft    Plan: Weightbearing: WBAT  Range of Motion: No range of motion restrictions.   Acitivity Restrictions:  May do straight line running at this time  No running distance or pace restrictions  No cutting, pivoting, or start-stop running  Goal to build strength, endurance, and confidence to allow sports in 3 months time (6 months from the date of surgery)  Brace: Discussed knee bracing options for sports including neoprene knee sleeve ACL functional bracing.   Discussed post-ACL reconstruction therapy program  Follow up: 3 months no XRays with an ACL functional test as completed by physical therapy.     I agree with history, physical and imaging as well as the assessment and plan as detailed by Dr. Rg.

## 2023-11-27 NOTE — LETTER
Return to Work  2023     Seen today: Yes    Patient:  Geoff Chase  :   1985  MRN:     [id number]  Physician: PAMELA JOINER    To whom it may concern,   Geoff Chase has been under my professional care for his left knee. Starting today, 2023, he is allowed to return to work but should be given the opportunity to rest, sit, stand or change positions as needed.    Sincerely,   Electronically signed by Pamela Joiner MD

## 2023-11-27 NOTE — LETTER
11/27/2023         RE: Geoff Chase  [address] [place] [address]        Dear Colleague,    Thank you for referring your patient, Geoff Chase, to the [facility] [place]. Please see a copy of my visit note below.    DIAGNOSIS:   Left knee acl tear, medial and lateral meniscus repairs    PROCEDURES:  Acl reconstrcution QT auto, medial and lateral meniscus repairs, DOS 8/31/23    HISTORY:  Doing well. Notices worse pain with going up and down stairs in the anterior knee around the patella tendon.     EXAM:     General: Awake, Alert, and oriented. Articulates and communicates with a normal affect     left Lower Extremity:  Incisions well healed without evidence of infection  No  post-operative effusion or ecchymosis  Range of motion 0-135  stability exam not performed  Neurovascularly intact    IMAGING:  No new images    ASSESSMENT:  Left acl QT and medial and lateral meniscus repair, 3 months post op     PLAN:   Weightbearing: WBAT  Work: Note provided for ok to return to work with opportunity to rest, sit, or stand as needed   Range of Motion: No range of motion restrictions  Crutches/Brace: Patient no longer requires the hinged knee brace or crutches.   Acitivity Restrictions:  Ok for in line running  Avoid cutting or pivoting     Follow up: 3 months with no new radiographs needed    Trinidad Rg MD   Sports Fellow       Patient seen and examined with the fellow. I also personally reviewed the images and interpreted the imaging myself.     Assesment: 3 month following acl reconstruction QT autograft    Plan: Weightbearing: WBAT  Range of Motion: No range of motion restrictions.   Acitivity Restrictions:  May do straight line running at this time  No running distance or pace restrictions  No cutting, pivoting, or start-stop running  Goal to build strength, endurance, and confidence to allow sports in 3 months time (6 months from the date of surgery)  Brace:  Discussed knee bracing options for sports including neoprene knee sleeve ACL functional bracing.   Discussed post-ACL reconstruction therapy program  Follow up: 3 months no XRays with an ACL functional test as completed by physical therapy.     I agree with history, physical and imaging as well as the assessment and plan as detailed by Dr. Rg.       Again, thank you for allowing me to participate in the care of your patient.        Sincerely,        Davey Joiner MD

## 2023-11-28 ENCOUNTER — TELEPHONE (OUTPATIENT)
Dept: ORTHOPEDICS | Facility: CLINIC | Age: 38
End: 2023-11-28

## 2023-11-28 NOTE — TELEPHONE ENCOUNTER
Writer faxed letter with restrictions to [facility] work force insurance  [phone].     Frances Guillermo LPN

## 2023-11-28 NOTE — TELEPHONE ENCOUNTER
[facility] Call Center    Phone Message    May a detailed message be left on voicemail: yes     Reason for Call: Kim WADE and  for patient calling about work status note, and restrictions need to be faxed over to [facility] work force insurance.  Please fax to Fax# [phone].  Any questions fall Kim [phone] can leave vm    Action Taken: Other: [facility]    Travel Screening: Not Applicable

## 2023-11-29 ENCOUNTER — TRANSFERRED RECORDS (OUTPATIENT)
Dept: HEALTH INFORMATION MANAGEMENT | Facility: CLINIC | Age: 38
End: 2023-11-29

## 2023-12-04 ENCOUNTER — TELEPHONE (OUTPATIENT)
Dept: ORTHOPEDICS | Facility: CLINIC | Age: 38
End: 2023-12-04

## 2023-12-04 NOTE — TELEPHONE ENCOUNTER
[facility] Call Center    Phone Message    May a detailed message be left on voicemail: yes     Reason for Call: Other: Heather with [employer] calling to request office visit notes from appointment on 11/28 and to get clarification on return to work note. Wondering if the patient has restrictions or if he has been fully released. Heather's fax number is [phone].      Action Taken: Message routed to:  [facility] ([facility]): [facility]    Travel Screening: Not Applicable

## 2023-12-12 ENCOUNTER — DOCUMENTATION ONLY (OUTPATIENT)
Dept: ORTHOPEDICS | Facility: CLINIC | Age: 38
End: 2023-12-12

## 2023-12-12 NOTE — PROGRESS NOTES
Received Completed forms Yes   Faxed Forms Faxed To: [place] [employer]  Fax Number: [phone]   Sent to HIM (Date) 12/11/23

## 2023-12-28 ENCOUNTER — DOCUMENTATION ONLY (OUTPATIENT)
Dept: ORTHOPEDICS | Facility: CLINIC | Age: 38
End: 2023-12-28

## 2023-12-28 NOTE — PROGRESS NOTES
Received Completed forms Yes   Faxed Forms Faxed To: patient  Fax Number: [phone]   Sent to HIM (Date) 12/27/23

## 2024-01-04 ENCOUNTER — MYC MEDICAL ADVICE (OUTPATIENT)
Dept: ORTHOPEDICS | Facility: CLINIC | Age: 39
End: 2024-01-04

## 2024-01-10 ENCOUNTER — TRANSFERRED RECORDS (OUTPATIENT)
Dept: HEALTH INFORMATION MANAGEMENT | Facility: CLINIC | Age: 39
End: 2024-01-10

## 2024-02-27 ENCOUNTER — TRANSFERRED RECORDS (OUTPATIENT)
Dept: HEALTH INFORMATION MANAGEMENT | Facility: CLINIC | Age: 39
End: 2024-02-27

## 2024-03-04 ENCOUNTER — OFFICE VISIT (OUTPATIENT)
Dept: ORTHOPEDICS | Facility: CLINIC | Age: 39
End: 2024-03-04
Payer: OTHER MISCELLANEOUS

## 2024-03-04 VITALS — WEIGHT: 235 LBS | HEIGHT: 69 IN | BODY MASS INDEX: 34.8 KG/M2

## 2024-03-04 DIAGNOSIS — Z98.890 S/P ACL RECONSTRUCTION: Primary | ICD-10-CM

## 2024-03-04 PROCEDURE — 99214 OFFICE O/P EST MOD 30 MIN: CPT | Performed by: ORTHOPAEDIC SURGERY

## 2024-03-04 NOTE — LETTER
3/4/2024         RE: Geoff Chase  [address] [place] [address]        Dear Colleague,    Thank you for referring your patient, Geoff Chase, to the [facility] [place]. Please see a copy of my visit note below.    DIAGNOSIS:   Left knee acl tear, medial and lateral meniscus repairs    PROCEDURES:  Acl reconstrcution QT auto, medial and lateral meniscus repairs, DOS 8/31/23    HISTORY:  6 months following the above.  He still reports continued symptoms in his knee.  He has pain both medially and laterally.  He keeps him from doing the things that he wants to do.  He does not feel confident in his knee because of this.  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    EXAM:     General: Awake, Alert, and oriented. Articulates and communicates with a normal affect     left Lower Extremity:  Incisions well healed without evidence of infection  No  post-operative effusion or ecchymosis  Range of motion 0-135  Lachman 0, no pivot shift  Neurovascularly intact    IMAGING:  No new images    ASSESSMENT:  Left acl QT and medial and lateral meniscus repair, 6 months post op     PLAN:   At this time I recognize the patient has not having continued symptoms and in light of this information I like to get a new MRI of his knee to evaluate the status of his meniscus.  I am highly confident based on his examination that his ACL graft is intact though I think it prudent that we evaluate this with a new MRI to confirm that we are not missing anything.  If his MRI demonstrates intact menisci and ACL graft you may continue with the physical therapy for strengthening range of motion flexion return to activities  Given his long travel distance we will have this arranged locally, pushed to our system and then we will do a telephone video visit to discuss      Again, thank you for allowing me to participate in the care of your patient.         Sincerely,        Davey Joiner MD

## 2024-03-04 NOTE — PROGRESS NOTES
DIAGNOSIS:   Left knee acl tear, medial and lateral meniscus repairs    PROCEDURES:  Acl reconstrcution QT auto, medial and lateral meniscus repairs, DOS 8/31/23    HISTORY:  6 months following the above.  He still reports continued symptoms in his knee.  He has pain both medially and laterally.  He keeps him from doing the things that he wants to do.  He does not feel confident in his knee because of this.  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    EXAM:     General: Awake, Alert, and oriented. Articulates and communicates with a normal affect     left Lower Extremity:  Incisions well healed without evidence of infection  No  post-operative effusion or ecchymosis  Range of motion 0-135  Lachman 0, no pivot shift  Neurovascularly intact    IMAGING:  No new images    ASSESSMENT:  Left acl QT and medial and lateral meniscus repair, 6 months post op     PLAN:   At this time I recognize the patient has not having continued symptoms and in light of this information I like to get a new MRI of his knee to evaluate the status of his meniscus.  I am highly confident based on his examination that his ACL graft is intact though I think it prudent that we evaluate this with a new MRI to confirm that we are not missing anything.  If his MRI demonstrates intact menisci and ACL graft you may continue with the physical therapy for strengthening range of motion flexion return to activities  Given his long travel distance we will have this arranged locally, pushed to our system and then we will do a telephone video visit to discuss

## 2024-03-04 NOTE — NURSING NOTE
"Reason For Visit:   Chief Complaint   Patient presents with    Left Knee - RECHECK     DOS 8/31/2023 left knee arthroscopy, ACL reconstruction with BTB autograft and meniscus repair.     ?  No    Date of surgery: 8/31/2023  Type of surgery:   PROCEDURE:  Examination under anesthesia left knee  Left knee arthroscopy  Quadricep tendon autograft ACL reconstruction  All inside medial meniscus repair  All inside lateral meniscus repair    Geoff returns to clinic six months from his left knee surgery. He has been attending physical therapy and completing his HEP. He continue to have pain with descending stairs, he feels his knee will become weak. He mentions that he continues to lack full knee extension.     SANE Score  Left Knee: 60  Right Knee: 99    Pain Assessment  Patient Currently in Pain: Yes  0-10 Pain Scale: 3  Primary Pain Location: Knee    Ht 1.753 m (5' 9\")   Wt 106.6 kg (235 lb)   BMI 34.70 kg/m         No Known Allergies    Current Outpatient Medications   Medication    acetaminophen (TYLENOL) 325 MG tablet    Semaglutide-Weight Management (WEGOVY) 0.25 MG/0.5ML pen    aspirin 81 MG EC tablet    tirzepatide (MOUNJARO) 5 MG/0.5ML pen     No current facility-administered medications for this visit.         Qing Rivers, ATC    "

## 2024-03-07 DIAGNOSIS — Z98.890 S/P ACL RECONSTRUCTION: Primary | ICD-10-CM

## 2024-03-25 ENCOUNTER — VIRTUAL VISIT (OUTPATIENT)
Dept: ORTHOPEDICS | Facility: CLINIC | Age: 39
End: 2024-03-25
Payer: OTHER MISCELLANEOUS

## 2024-03-25 DIAGNOSIS — Z98.890 S/P ACL RECONSTRUCTION: Primary | ICD-10-CM

## 2024-03-25 PROCEDURE — 99214 OFFICE O/P EST MOD 30 MIN: CPT | Mod: 93 | Performed by: ORTHOPAEDIC SURGERY

## 2024-03-25 NOTE — LETTER
3/25/2024         RE: Geoff Chase  [address] [place] [address]        Dear Colleague,    Thank you for referring your patient, Geoff Chase, to the [facility] [place]. Please see a copy of my visit note below.        Geoff is a 38 year old who is being evaluated via a billable telephone visit.    I the opportunity to complete a telephone visit with the patient today for their long travel distance to follow-up with the MRI.    The patient is status post ACL reconstruction with quadricep tendon autograft, medial meniscus repair, lateral meniscus repair.  He was still having some symptoms so we obtained a new MRI after our last visit this was completed March 18, 2024 and scanned into our system.    No examination was completed today as this was a virtual visit.    The patient will be returning to work soon as he is starting his summer schedule.    MRI pushed to our system and independently reviewed by myself shows intact left knee ACL graft.  No concerns for graft rupture.  There are some postsurgical changes of the medial meniscus though no displaced flaps are present.  There are also postsurgical changes seen in the lateral meniscus though again no displaced flaps are present.    Clinical assessment: Left knee status post ACL reconstruction quadricep tendon autograft with intact graft.     Medial meniscus with postsurgical change status post medial meniscus repair no displaced flaps     Lateral meniscus with postsurgical change status post lateral meniscus repair with no displaced flaps    Plan: I had a long discussion with the patient.  At this time I told him very clearly that overall I am confident with how this MRI looks.  I think that he will do well.  I do not think there is any pressing surgical intervention based on this MRI.  With that said I also concede that he may not see lasting improvement and if he has ongoing symptoms and he cannot do the things  that he wants I ultimately would offer arthroscopic evaluation.  There are no concerns for rupture of his anterior cruciate ligament graft.  At this time the patient is really starting work soon.  He really does not feel that he can miss more time at work.  So in light of this information we are going to have him maximize nonsurgical management over the summer.  He will follow-up with me on an as-needed basis have asked him to give me a call or MyChart as the year goes along to let us know how he is doing.    He does have a standing offer for arthroscopic evaluation if he fails to see lasting improvement.  The patient voiced understanding this.  He will let us know how would like to proceed.        How would you like to obtain your AVS? MyChart  Originating Location (pt. Location): Home    Distant Location (provider location):  On-site  Phone call duration: 10 minutes      Again, thank you for allowing me to participate in the care of your patient.        Sincerely,        Davey Joiner MD

## 2024-03-25 NOTE — PROGRESS NOTES
Geoff is a 38 year old who is being evaluated via a billable telephone visit.    I the opportunity to complete a telephone visit with the patient today for their long travel distance to follow-up with the MRI.    The patient is status post ACL reconstruction with quadricep tendon autograft, medial meniscus repair, lateral meniscus repair.  He was still having some symptoms so we obtained a new MRI after our last visit this was completed March 18, 2024 and scanned into our system.    No examination was completed today as this was a virtual visit.    The patient will be returning to work soon as he is starting his summer schedule.    MRI pushed to our system and independently reviewed by myself shows intact left knee ACL graft.  No concerns for graft rupture.  There are some postsurgical changes of the medial meniscus though no displaced flaps are present.  There are also postsurgical changes seen in the lateral meniscus though again no displaced flaps are present.    Clinical assessment: Left knee status post ACL reconstruction quadricep tendon autograft with intact graft.     Medial meniscus with postsurgical change status post medial meniscus repair no displaced flaps     Lateral meniscus with postsurgical change status post lateral meniscus repair with no displaced flaps    Plan: I had a long discussion with the patient.  At this time I told him very clearly that overall I am confident with how this MRI looks.  I think that he will do well.  I do not think there is any pressing surgical intervention based on this MRI.  With that said I also concede that he may not see lasting improvement and if he has ongoing symptoms and he cannot do the things that he wants I ultimately would offer arthroscopic evaluation.  There are no concerns for rupture of his anterior cruciate ligament graft.  At this time the patient is really starting work soon.  He really does not feel that he can miss more time at work.  So in light  of this information we are going to have him maximize nonsurgical management over the summer.  He will follow-up with me on an as-needed basis have asked him to give me a call or MyChart as the year goes along to let us know how he is doing.    He does have a standing offer for arthroscopic evaluation if he fails to see lasting improvement.  The patient voiced understanding this.  He will let us know how would like to proceed.        How would you like to obtain your AVS? MyChart  Originating Location (pt. Location): Home    Distant Location (provider location):  On-site  Phone call duration: 10 minutes

## 2024-04-22 ENCOUNTER — TRANSFERRED RECORDS (OUTPATIENT)
Dept: HEALTH INFORMATION MANAGEMENT | Facility: CLINIC | Age: 39
End: 2024-04-22

## 2024-11-16 ENCOUNTER — HEALTH MAINTENANCE LETTER (OUTPATIENT)
Age: 39
End: 2024-11-16

## (undated) DEVICE — PREP CHLORAPREP 26ML TINTED ORANGE  260815

## (undated) DEVICE — DRSG STERI STRIP 1/2X4" R1547

## (undated) DEVICE — PEN MARKING SKIN W/PAPER RULER 31145785

## (undated) DEVICE — PACK ARTHROSCOPY CUSTOM ASC

## (undated) DEVICE — BUR ARTHREX COOLCUT SABRE 4.0MMX13CM AR-8400SR

## (undated) DEVICE — ARTHREX KNOT PUSHER/SUTURE CUTTER FOR 2-0 FIBERWIRE AR-5815

## (undated) DEVICE — ESU GROUND PAD ADULT W/CORD E7507

## (undated) DEVICE — SU VICRYL 2-0 CT-1 27" UND J259H

## (undated) DEVICE — LINEN ORTHO PACK 5446

## (undated) DEVICE — SUCTION MANIFOLD NEPTUNE 2 SYS 4 PORT 0702-020-000

## (undated) DEVICE — TUBING SYSTEM ARTHREX PATIENT REDEUCE AR-6421

## (undated) DEVICE — GLOVE BIOGEL PI MICRO INDICATOR UNDERGLOVE SZ 6.5 48965

## (undated) DEVICE — BLADE SAW OSCILLATING STRK MICRO 9X10X0.51MM 2296-033-220

## (undated) DEVICE — GLOVE BIOGEL PI MICRO SZ 8.0 48580

## (undated) DEVICE — INSTRUMENT PORTAL SKID AR-4505

## (undated) DEVICE — PIN GUIDE ARTHREX 2.4MM DRILL  AR-1250L

## (undated) DEVICE — SU ETHIBOND 1 CT-1 30" X425H

## (undated) DEVICE — GLOVE BIOGEL PI MICRO SZ 6.0 48560

## (undated) DEVICE — ESU PENCIL SMOKE EVAC W/ROCKER SWITCH 0703-047-000

## (undated) DEVICE — SOL NACL 0.9% IRRIG 3000ML BAG 2B7477

## (undated) DEVICE — ABLATOR ARTHREX APOLLO RF MP90 ASPIRATING 90DEG AR-9811

## (undated) DEVICE — GLOVE BIOGEL PI MICRO INDICATOR UNDERGLOVE SZ 8.0 48980

## (undated) DEVICE — Device

## (undated) DEVICE — PAD ARMBOARD FOAM EGGCRATE COVIDEN 3114367

## (undated) DEVICE — SU MONOCRYL 3-0 PS-1 27" Y936H

## (undated) DEVICE — PACK ACL SUPPLEMENT CUSTOM ASC

## (undated) RX ORDER — DIPHENHYDRAMINE HYDROCHLORIDE 50 MG/ML
INJECTION INTRAMUSCULAR; INTRAVENOUS
Status: DISPENSED
Start: 2023-08-31

## (undated) RX ORDER — HYDROMORPHONE HYDROCHLORIDE 1 MG/ML
INJECTION, SOLUTION INTRAMUSCULAR; INTRAVENOUS; SUBCUTANEOUS
Status: DISPENSED
Start: 2023-08-31

## (undated) RX ORDER — PROPOFOL 10 MG/ML
INJECTION, EMULSION INTRAVENOUS
Status: DISPENSED
Start: 2023-08-31

## (undated) RX ORDER — ACETAMINOPHEN 325 MG/1
TABLET ORAL
Status: DISPENSED
Start: 2023-08-31

## (undated) RX ORDER — ONDANSETRON 2 MG/ML
INJECTION INTRAMUSCULAR; INTRAVENOUS
Status: DISPENSED
Start: 2023-08-31

## (undated) RX ORDER — DEXAMETHASONE SODIUM PHOSPHATE 4 MG/ML
INJECTION, SOLUTION INTRA-ARTICULAR; INTRALESIONAL; INTRAMUSCULAR; INTRAVENOUS; SOFT TISSUE
Status: DISPENSED
Start: 2023-08-31

## (undated) RX ORDER — CEFAZOLIN SODIUM 2 G/50ML
SOLUTION INTRAVENOUS
Status: DISPENSED
Start: 2023-08-31

## (undated) RX ORDER — KETOROLAC TROMETHAMINE 30 MG/ML
INJECTION, SOLUTION INTRAMUSCULAR; INTRAVENOUS
Status: DISPENSED
Start: 2023-08-31

## (undated) RX ORDER — OXYCODONE HYDROCHLORIDE 5 MG/1
TABLET ORAL
Status: DISPENSED
Start: 2023-08-31

## (undated) RX ORDER — FENTANYL CITRATE 50 UG/ML
INJECTION, SOLUTION INTRAMUSCULAR; INTRAVENOUS
Status: DISPENSED
Start: 2023-08-31